# Patient Record
Sex: FEMALE | Race: WHITE | Employment: OTHER | URBAN - METROPOLITAN AREA
[De-identification: names, ages, dates, MRNs, and addresses within clinical notes are randomized per-mention and may not be internally consistent; named-entity substitution may affect disease eponyms.]

---

## 2021-08-12 ENCOUNTER — HOSPITAL ENCOUNTER (OUTPATIENT)
Dept: WOUND CARE | Age: 81
Discharge: HOME | End: 2021-08-12
Attending: PODIATRIST
Payer: MEDICARE

## 2021-08-12 VITALS
DIASTOLIC BLOOD PRESSURE: 54 MMHG | TEMPERATURE: 98.4 F | HEART RATE: 74 BPM | OXYGEN SATURATION: 100 % | SYSTOLIC BLOOD PRESSURE: 125 MMHG | RESPIRATION RATE: 18 BRPM

## 2021-08-12 PROCEDURE — 99211 OFF/OP EST MAY X REQ PHY/QHP: CPT

## 2021-08-16 NOTE — DISCHARGE INSTRUCTIONS
Discharge Instructions for  [address]  [address], [place], [facility]0 [facility]  [phone] Fax [phone]    NAME:  Judson Matamoros  YOB: 1940  MEDICAL RECORD NUMBER:  [id number]  DATE:  8/12/2021    Wound Cleansing:   Do not scrub or use excessive force. Cleanse wound prior to applying a clean dressing with:  [] Normal Saline [] Keep Wound Dry in Shower    [] Wound Cleanser   [] Cleanse wound with Mild Soap & Water  [] May Shower at Discharge   [] Other:      Topical Treatments:  Do not apply lotions, creams, or ointments to wound bed unless directed. [x] Apply moisturizing lotion to skin surrounding the wound prior to dressing change. [x] Apply antifungal ointment to skin surrounding the wound prior to dressing change. [x] Apply thin film of moisture barrier ointment to skin immediately around wound. [] Other:       Dressings:           Wound Location ***   [] Apply Primary Dressing:       [] MediHoney Gel [x] Alginate with Silver [] Alginate   [] Collagen [] Collagen with Silver   [] Santyl with Moisten saline gauze     [] Hydrocolloid   [] MediHoney Alginate [] Foam with Silver   [] Foam   [] Hydrofera Blue    [] Mepilex Border    [] Moisten with Saline [] Hydrogel [] Mepitel     [] Bactroban/Mupirocin [] Polysporin  [x] Other:  Tubular dressing[] Pack wound loosely with  [] Iodoform   [] Plain Packing  [] Other   [x] Cover and Secure with:     [x] Gauze [] Connie [] Kerlix   [] Ace Wrap [] Cover Roll Tape [] ABD     [] Other:    Avoid contact of tape with skin.   [x] Change dressing: [] Daily    [] Every Other Day [] Three times per week   [x] Once a week [] Do Not Change Dressing   [] Other:     Negative Pressure:           Wound Location:   [] Pressure@           mm/Hg  []Continuous []Intermittent   [] Black  [] White Foam [] Other:   []Change dressing: []Three times per week    []Other:     Pressure Relief:  [] When sitting, shift position or do seat lifts every 15 minutes.  [] Wheelchair cushion [] Specialty Bed/Mattress  [] Turn every 2 hours when in bed. Avoid position directing pressure on wound site. Limit side lying to 30 degree tilt. Limit HOB elevation to 30 degrees. Edema Control:  Apply: [] Compression Stocking []Right Leg []Left Leg   [] Tubigrip []Right Leg Double Layer []Left Leg Double Layer       []Right Leg Single Layer []Left Leg Single Layer   [] SpandaGrip []Right Leg  []Left Leg      []Low compression 5-10 mm/Hg      []Medium compression 10-20 mm/Hg     []High compression  20-30 mm/Hg   every morning immediately when getting up should be applied to affected leg(s) from mid foot to knee making sure to cover the heel. Remove every night before going to bed. [] Elevate leg(s) above the level of the heart when sitting. [] Avoid prolonged standing in one place. [] Elevate arm/hand above the level of the heart []RightArm []LeftArm     Compression:  Apply: [] Multilayer Compression Wrap Applied in Clinic []RightLeg []Left Leg   [] Multi-layer compression. Do not get leg(s) with wrap wet. If wraps become too tight call the center or completely remove the wrap. [] Elevate leg(s) above the level of the heart when sitting. [] Avoid prolonged standing in one place. Off-Loading:   [] Off-loading when [] walking  [] in bed [] sitting  [] Total non-weight bearing  [] Right Leg  [] Left Leg   [] Assistive Device [] Walker [] Cane  [] Wheelchair  [] Crutches   [] Surgical shoe    [] Podus Boot(s)   [] Foam Boot(s)  [] Roll About    [] Cast Boot [] CROW Boot  [] Other:    Contact Cast:  Apply: [] Total Contact Cast Applied in Clinic []RightLeg []Left Leg   [] Do not get cast wet. Contact center or go to emergency room if there is a foul odor or becomes uncomfortable due to feeling tight or swelling. Do not use objects inside of cast to scratch.       Dietary:  [x] Diet as tolerated: [] Calorie Diabetic Diet: [] No Added Salt:  [] Increase Protein: [] Other:   Activity:  [x] Activity as tolerated:  [] Patient has no activity restrictions     [] Strict Bedrest: [] Remain off Work:     [] May return to full duty work:                                   [] Return to work with restrictions:             Return Appointment:  [x] Wound and dressing supply provider:   [] ECF or Home Healthcare:  [x] Wound Assessment:* [x] Physician or NP scheduled for Wound Assessment:   [x] Return Appointment:1 week(s)  [] Ordered tests:      Electronically signed Inessa Vargas LPN on 4/32/8907 at 8:54 AM     Estefany Brady 281: Should you experience any significant changes in your wound(s) or have questions about your wound care, please contact the [address] [place] [address] at [address] [place] [address] 8:00 am - 4:30. If you need help with your wound outside these hours and cannot wait until we are again available, contact your PCP or go to the hospital emergency room. PLEASE NOTE: IF YOU ARE UNABLE TO OBTAIN WOUND SUPPLIES, CONTINUE TO USE THE SUPPLIES YOU HAVE AVAILABLE UNTIL YOU ARE ABLE TO REACH US. IT IS MOST IMPORTANT TO KEEP THE WOUND COVERED AT ALL TIMES.      Physician Signature:_______________________    Date: ___________ Time:  ____________

## 2021-08-16 NOTE — WOUND CARE
08/12/21 1618   Wound Chin Left wet 08/12/21   Date First Assessed/Time First Assessed: 08/12/21 0834   Present on Hospital Admission: Yes  Wound Approximate Age at First Assessment (Weeks): 52 weeks  Primary Wound Type: Other (comment)  Location: Chin  Wound Location Orientation: Left  Wound Desc. .. Wound Image    Wound Etiology Other (Comment)   Dressing Status Intact; New dressing applied; Old drainage noted   Cleansed Wound cleanser   Dressing/Treatment Alginate with Ag; Other (Comment)  (border,Tubular dressing.)   Dressing Change Due 08/19/21   Wound Length (cm) 3 cm   Wound Width (cm) 2.5 cm   Wound Depth (cm) 0.1 cm   Wound Surface Area (cm^2) 7.5 cm^2   Wound Volume (cm^3) 0.75 cm^3   Post-Procedure Length (cm) 3.1 cm   Post-Procedure Width (cm) 2.6 cm   Post-Procedure Depth (cm) 0.1 cm   Post-Procedure Surface Area (cm^2) 8.06 cm^2   Post-Procedure Volume (cm^3) 0.806 cm^3   Wound Assessment Pink/red   Drainage Amount Small   Drainage Description Thin;Yellow   Wound Odor None   Wound Chin Left; Inferior 08/12/21   Date First Assessed/Time First Assessed: 08/12/21 0844   Present on Hospital Admission: Yes  Wound Approximate Age at First Assessment (Weeks): 52 weeks  Primary Wound Type: Other (comment)  Location: Chin  Wound Location Orientation: Left; Inferior  D... Wound Etiology Other (Comment)   Dressing Status Intact   Cleansed Wound cleanser   Dressing/Treatment Alginate with Ag; Other (Comment)  (border tubular dressing)   Dressing Change Due 08/19/21   Wound Length (cm) 1.5 cm   Wound Width (cm) 1 cm   Wound Depth (cm) 0.1 cm   Wound Surface Area (cm^2) 1.5 cm^2   Wound Volume (cm^3) 0.15 cm^3   Post-Procedure Length (cm) 1.6 cm   Post-Procedure Width (cm) 1 cm   Post-Procedure Depth (cm) 0.1 cm   Post-Procedure Surface Area (cm^2) 1.6 cm^2   Post-Procedure Volume (cm^3) 0.16 cm^3   Wound Assessment Pink/red   Drainage Amount Small   Drainage Description Thin;Yellow   Wound Odor None Ling-Wound/Incision Assessment Blanchable erythema   Edges Attached edges   Wound Thickness Description Full thickness

## 2021-08-27 ENCOUNTER — HOSPITAL ENCOUNTER (OUTPATIENT)
Dept: WOUND CARE | Age: 81
Discharge: HOME | End: 2021-08-27
Attending: PODIATRIST
Payer: MEDICARE

## 2021-08-27 VITALS
SYSTOLIC BLOOD PRESSURE: 152 MMHG | RESPIRATION RATE: 18 BRPM | DIASTOLIC BLOOD PRESSURE: 72 MMHG | OXYGEN SATURATION: 94 % | TEMPERATURE: 98.3 F | HEART RATE: 97 BPM

## 2021-08-27 PROCEDURE — 11105 PUNCH BX SKIN EA SEP/ADDL: CPT | Performed by: PODIATRIST

## 2021-08-27 PROCEDURE — 87077 CULTURE AEROBIC IDENTIFY: CPT

## 2021-08-27 PROCEDURE — 11104 PUNCH BX SKIN SINGLE LESION: CPT

## 2021-08-27 PROCEDURE — 87205 SMEAR GRAM STAIN: CPT

## 2021-08-27 PROCEDURE — 88305 TISSUE EXAM BY PATHOLOGIST: CPT

## 2021-08-27 PROCEDURE — 87075 CULTR BACTERIA EXCEPT BLOOD: CPT

## 2021-08-27 PROCEDURE — 87186 SC STD MICRODIL/AGAR DIL: CPT

## 2021-08-27 RX ORDER — DOXYCYCLINE 100 MG/1
100 CAPSULE ORAL 2 TIMES DAILY
COMMUNITY

## 2021-08-27 NOTE — WOUND CARE
[address]:     [facility] [address]  p: [phone] f: [phone]  Ref# [id number]  Fax: [phone]    Marlen:     [facility]  [address]  [address] [address] [phone]  WOUND CARE [unfilled]   FAX NUMBER [unfilled]    Patient Information:      Maria Del Rosario [address] [address]   [unfilled]   : 1940  AGE: 80 y.o. GENDER: female   TODAYS DATE:  2021    Insurance:      PRIMARY INSURANCE:  [id number] - (Medicare)    Payor/Plan Subscr  Sex Relation Sub. Ins. ID Effective Group Num   1. [place] [address] 1940 Female Self [id number]                                    [address]   2. BLUE [facility] [place] 1937 Male Spouse [id number]                                   [address] [address]       Patient Wound Information:      Problem List Items Addressed This Visit     None          WOUNDS REQUIRING DRESSING SUPPLIES:     Wound Chin Left wet 21 (Active)   Wound Image   21 1618   Wound Etiology Other (Comment) 21 1618   Dressing Status Intact; New dressing applied; Old drainage noted 21 1618   Cleansed Wound cleanser 21 1618   Dressing/Treatment Alginate with Ag; Other (Comment) 21 1618   Dressing Change Due 21 1618   Wound Length (cm) 3 cm 21 1618   Wound Width (cm) 2.5 cm 21 1618   Wound Depth (cm) 0.1 cm 21 1618   Wound Surface Area (cm^2) 7.5 cm^2 21 1618   Wound Volume (cm^3) 0.75 cm^3 21 1618   Post-Procedure Length (cm) 3.1 cm 21 1618   Post-Procedure Width (cm) 2.6 cm 21 1618   Post-Procedure Depth (cm) 0.1 cm 21 1618   Post-Procedure Surface Area (cm^2) 8.06 cm^2 21 1618   Post-Procedure Volume (cm^3) 0.806 cm^3 21 1618   Wound Assessment Pink/red 218   Drainage Amount Small 218 Drainage Description Thin;Yellow 08/12/21 1618   Wound Odor None 08/12/21 1618   Number of days: 15       Wound Chin Left; Inferior 08/12/21 (Active)   Wound Etiology Other (Comment) 08/12/21 1618   Dressing Status Intact 08/12/21 1618   Cleansed Wound cleanser 08/12/21 1618   Dressing/Treatment Alginate with Ag; Other (Comment) 08/12/21 1618   Dressing Change Due 08/19/21 08/12/21 1618   Wound Length (cm) 1.5 cm 08/12/21 1618   Wound Width (cm) 1 cm 08/12/21 1618   Wound Depth (cm) 0.1 cm 08/12/21 1618   Wound Surface Area (cm^2) 1.5 cm^2 08/12/21 1618   Wound Volume (cm^3) 0.15 cm^3 08/12/21 1618   Post-Procedure Length (cm) 1.6 cm 08/12/21 1618   Post-Procedure Width (cm) 1 cm 08/12/21 1618   Post-Procedure Depth (cm) 0.1 cm 08/12/21 1618   Post-Procedure Surface Area (cm^2) 1.6 cm^2 08/12/21 1618   Post-Procedure Volume (cm^3) 0.16 cm^3 08/12/21 1618   Wound Assessment Pink/red 08/12/21 1618   Drainage Amount Small 08/12/21 1618   Drainage Description Thin;Yellow 08/12/21 1618   Wound Odor None 08/12/21 1618   Ling-Wound/Incision Assessment Blanchable erythema 08/12/21 1618   Edges Attached edges 08/12/21 1618   Wound Thickness Description Full thickness 08/12/21 1618   Number of days: 15       Wound Pretibial Left Bullous type lesion 06/01/21 (Active)   Wound Image   08/27/21 1334   Wound Etiology Other (Comment) 08/27/21 1334   Dressing Status Breakthrough drainage noted 08/27/21 1334   Cleansed Cleansed with saline 08/27/21 1334   Dressing/Treatment Alginate with Ag 08/27/21 1334   Dressing Change Due 08/28/21 08/27/21 1334   Wound Length (cm) 5 cm 08/27/21 1334   Wound Width (cm) 3.5 cm 08/27/21 1334   Wound Depth (cm) 0.1 cm 08/27/21 1334   Wound Surface Area (cm^2) 17.5 cm^2 08/27/21 1334   Wound Volume (cm^3) 1.75 cm^3 08/27/21 1334   Wound Assessment Other (Comment) 08/27/21 1334   Drainage Amount Large 08/27/21 1334   Drainage Description Serous 08/27/21 1334   Wound Odor None 08/27/21 1334 Ling-Wound/Incision Assessment Blanchable erythema 08/27/21 1334   Edges Other (Comment) 08/27/21 1334   Wound Thickness Description Partial thickness 08/27/21 1334   Number of days: 15        Supplies Requested :      WOUND #:1   PRIMARY DRESSING:  Alginate with silver pad   4X4 gauze pad     FREQUENCY OF DRESSING CHANGES:  Daily       ADDITIONAL ITEMS:  [] Gloves Small  [x] Gloves Medium [] Gloves Large [] Gloves XLarge  [] Tape 1\" [x] Tape 2\" Hypafix [] Tape 3\"  [] Medipore Tape  [x] Saline  [] Skin Prep   [x] Adhesive Remover   [] Cotton Tip Applicators   [] Other:    Patient Wound(s) Debrided: [x] Yes if yes please add date 8/27/2021   [] No    Debribement Type: Excisional/Sharp    Patient currently being seen by Home Health: [] Yes   [x] No    Duration for needed supplies:  []15  [x]30  []60  []90 Days    Electronically signed by Yissel Seals RN on 8/27/2021 at 3:32 PM    Provider Information:      Josetta Gilford NAME: Kristian Manzo     NPI: [id number]

## 2021-08-27 NOTE — WOUND CARE
08/27/21 1334   Wound Pretibial Left Bullous type lesion 06/01/21   Date First Assessed/Time First Assessed: 08/12/21 0917   Present on Hospital Admission: Yes  Wound Approximate Age at First Assessment (Weeks): 20 weeks  Primary Wound Type: Other (comment)  Location: Pretibial  Wound Location Orientation: Left  Wound. ..    Wound Image    Wound Etiology Other (Comment)   Dressing Status Breakthrough drainage noted   Cleansed Cleansed with saline   Dressing/Treatment Alginate with Ag   Dressing Change Due 08/28/21   Wound Length (cm) 5 cm   Wound Width (cm) 3.5 cm   Wound Depth (cm) 0.1 cm   Wound Surface Area (cm^2) 17.5 cm^2   Wound Volume (cm^3) 1.75 cm^3   Wound Assessment Other (Comment)   Drainage Amount Large   Drainage Description Serous   Wound Odor None   Ling-Wound/Incision Assessment Blanchable erythema   Edges Other (Comment)   Wound Thickness Description Partial thickness

## 2021-08-27 NOTE — WOUND CARE
Procedure  Biopsy Note  Patient is an 60-year-old female who returns to the wound care clinic for continued care of 2 large soft tissue mass/tumors on the front of her left leg. She states that she is not sure how they got there, or what they are and would like to know. Therefore biopsy and wound culture are planned for today. Also patient had an x-ray of her left leg that shows a lucency in the anterior tibial cortex overlying the soft tissue abnormality. Patient has 2 total knee implants and is not a good candidate for MRI, therefore a prescription was given for bone scan of the left tibia to rule out any pathology or osteomyelitis deep to the soft tissue mass/tumors. Biopsy Type: Punch Biopsy of skin single lesion and Punch Biopsy of skin each separate/additional lesion    Performed by: Mohan Torres DPM    Consent obtained? Yes     Time out taken: Yes    Location of Biopsy:  Wound/Ulcer Number(s)  Wound/Ulcer #: 2    Wound Chin Left wet 08/12/21 (Active)   Wound Image   08/12/21 1618   Wound Etiology Other (Comment) 08/12/21 1618   Dressing Status Intact; New dressing applied; Old drainage noted 08/12/21 1618   Cleansed Wound cleanser 08/12/21 1618   Dressing/Treatment Alginate with Ag; Other (Comment) 08/12/21 1618   Dressing Change Due 08/19/21 08/12/21 1618   Wound Length (cm) 3 cm 08/12/21 1618   Wound Width (cm) 2.5 cm 08/12/21 1618   Wound Depth (cm) 0.1 cm 08/12/21 1618   Wound Surface Area (cm^2) 7.5 cm^2 08/12/21 1618   Wound Volume (cm^3) 0.75 cm^3 08/12/21 1618   Post-Procedure Length (cm) 3.1 cm 08/12/21 1618   Post-Procedure Width (cm) 2.6 cm 08/12/21 1618   Post-Procedure Depth (cm) 0.1 cm 08/12/21 1618   Post-Procedure Surface Area (cm^2) 8.06 cm^2 08/12/21 1618   Post-Procedure Volume (cm^3) 0.806 cm^3 08/12/21 1618   Wound Assessment Pink/red 08/12/21 1618   Drainage Amount Small 08/12/21 1618   Drainage Description Thin;Yellow 08/12/21 1618   Wound Odor None 08/12/21 1618   Number of days: 15       Wound Chin Left; Inferior 08/12/21 (Active)   Wound Etiology Other (Comment) 08/12/21 1618   Dressing Status Intact 08/12/21 1618   Cleansed Wound cleanser 08/12/21 1618   Dressing/Treatment Alginate with Ag; Other (Comment) 08/12/21 1618   Dressing Change Due 08/19/21 08/12/21 1618   Wound Length (cm) 1.5 cm 08/12/21 1618   Wound Width (cm) 1 cm 08/12/21 1618   Wound Depth (cm) 0.1 cm 08/12/21 1618   Wound Surface Area (cm^2) 1.5 cm^2 08/12/21 1618   Wound Volume (cm^3) 0.15 cm^3 08/12/21 1618   Post-Procedure Length (cm) 1.6 cm 08/12/21 1618   Post-Procedure Width (cm) 1 cm 08/12/21 1618   Post-Procedure Depth (cm) 0.1 cm 08/12/21 1618   Post-Procedure Surface Area (cm^2) 1.6 cm^2 08/12/21 1618   Post-Procedure Volume (cm^3) 0.16 cm^3 08/12/21 1618   Wound Assessment Pink/red 08/12/21 1618   Drainage Amount Small 08/12/21 1618   Drainage Description Thin;Yellow 08/12/21 1618   Wound Odor None 08/12/21 1618   Ling-Wound/Incision Assessment Blanchable erythema 08/12/21 1618   Edges Attached edges 08/12/21 1618   Wound Thickness Description Full thickness 08/12/21 1618   Number of days: 15       Wound Pretibial Left Bullous type lesion 06/01/21 (Active)   Wound Image   08/27/21 1334   Wound Etiology Other (Comment) 08/27/21 1334   Dressing Status Breakthrough drainage noted 08/27/21 1334   Cleansed Cleansed with saline 08/27/21 1334   Dressing/Treatment Alginate with Ag 08/27/21 1334   Dressing Change Due 08/28/21 08/27/21 1334   Wound Length (cm) 5 cm 08/27/21 1334   Wound Width (cm) 3.5 cm 08/27/21 1334   Wound Depth (cm) 0.1 cm 08/27/21 1334   Wound Surface Area (cm^2) 17.5 cm^2 08/27/21 1334   Wound Volume (cm^3) 1.75 cm^3 08/27/21 1334   Wound Assessment Other (Comment) 08/27/21 1334   Drainage Amount Large 08/27/21 1334   Drainage Description Serous 08/27/21 1334   Wound Odor None 08/27/21 1334   Ling-Wound/Incision Assessment Blanchable erythema 08/27/21 1334   Edges Other (Comment) 08/27/21 1334 Wound Thickness Description Partial thickness 08/27/21 1334   Number of days: 15        Biopsy performed with: 3 mm skin punch     Instruments: # 11 blade scalpel      Specimen sent to Pathology:Yes    Total number of Biopsy Specimen: 3     Estimated Blood Loss:  Minimal     Hemostasis Achieved: Pressure    Procedural Pain: 1 / 10     Post Procedural Pain: 2 / 10     Response to treatment: Well tolerated by patient    3 punch biopsy specimens were obtained today from the left leg and also deep wound cultures were taken from the lesions and sent to micro. Patient will follow up in the wound care clinic in 1 week.

## 2021-08-27 NOTE — DISCHARGE INSTRUCTIONS
Discharge Instructions for  [address]  [address] [place], [place], [facility]0 [facility]  [phone] Fax [phone]    NAME:  Mitch Farrell  YOB: 1940  MEDICAL RECORD NUMBER:  [id number]  DATE:  8/27/2021    Wound Cleansing:   Do not scrub or use excessive force. Cleanse wound prior to applying a clean dressing with:  [x] Normal Saline [] Keep Wound Dry in Shower    [] Wound Cleanser   [] Cleanse wound with Mild Soap & Water  [] May Shower at Discharge   [] Other:      Topical Treatments:  Do not apply lotions, creams, or ointments to wound bed unless directed. Dressings:           Wound Location L leg   [] Apply Primary Dressing:       [] MediHoney Gel [x] Alginate with Silver [] Alginate   [] Collagen [] Collagen with Silver   [] Santyl with Moisten saline gauze     [] Hydrocolloid   [] MediHoney Alginate [] Foam with Silver   [] Foam   [] Hydrofera Blue    [] Mepilex Border    [] Moisten with Saline [] Hydrogel [] Mepitel     [] Bactroban/Mupirocin [] Polysporin  [] Other:    [] Pack wound loosely with  [] Iodoform   [] Plain Packing  [] Other   [x] Cover and Secure with:     [x] Gauze [] Connie [] Kerlix   [] Ace Wrap [] Cover Roll Tape [] ABD     [] Other:    Avoid contact of tape with skin.   [x] Change dressing: [x] Daily    [] Every Other Day [] Three times per week   [] Once a week [] Do Not Change Dressing   [] Other:     Dietary:  [x] Diet as tolerated: [] Calorie Diabetic Diet: [] No Added Salt:  [] Increase Protein: [] Other:     Activity:  [x] Activity as tolerated:  [] Patient has no activity restrictions     [] Strict Bedrest: [] Remain off Work:     [] May return to full duty work:                                   [] Return to work with restrictions:             Return Appointment:  [] Wound and dressing supply provider:   [] ECF or Home Healthcare:  [] Wound Assessment: [] Physician or NP scheduled for Wound Assessment:   [x] Return Appointment: With MD once bone scan completed   [] Ordered tests:      Electronically signed April David Krause RN on 8/27/2021 at 1:48 PM     [address] Information: Should you experience any significant changes in your wound(s) or have questions about your wound care, please contact the [facility] at [address] [place] [address] 8:00 am - 4:30. If you need help with your wound outside these hours and cannot wait until we are again available, contact your PCP or go to the hospital emergency room. PLEASE NOTE: IF YOU ARE UNABLE TO OBTAIN WOUND SUPPLIES, CONTINUE TO USE THE SUPPLIES YOU HAVE AVAILABLE UNTIL YOU ARE ABLE TO REACH US. IT IS MOST IMPORTANT TO KEEP THE WOUND COVERED AT ALL TIMES.

## 2021-08-29 LAB
BACTERIA SPEC CULT: NORMAL
SERVICE CMNT-IMP: NORMAL

## 2021-08-30 LAB
BACTERIA SPEC CULT: ABNORMAL
GRAM STN SPEC: ABNORMAL
GRAM STN SPEC: ABNORMAL
SERVICE CMNT-IMP: ABNORMAL

## 2021-11-03 NOTE — WOUND CARE
[address]   Progress Note and Procedure Note   NO Procedure      Mundo Mary  MEDICAL RECORD NUMBER:  [id number]  AGE: 80 y.o. RACE WHITE/NON-  GENDER: female  : 1940  EPISODE DATE:  2021    Subjective:     Chief Complaint   Patient presents with    Wound Check         HISTORY of PRESENT ILLNESS HPI    [email or web address] is a 80 y.o. female who presents today for wound/ulcer evaluation. History of Wound Context: Patient is complaining of a open wound on her left shin. She states is started in 2020 with a small red spot, then in 2021 it started getting larger with a bump, she went to her Primary Care Physician, Dr. Marcy Cruz, prescribed her antibiotic in , for three weeks, with some improvement. Patient has been doing home treatments daily  She returned to Dr. Maria Ines Rosario, in 2021, a wound culture was taken, patient states her pain is (1/10) and has continued with her Bacitracin ointment daily. On 2021, Dr. Maria Ines Rosario took a biopsy. On August 3, 2021, the biopsy results - No Cancer, it showed - Culture Strep with Gram Negative Rods. Wound/Ulcer Pain Timing/Severity: severe  Quality of pain: aching and radiating  Severity:  3 / 10   Modifying Factors: Pain worsens with walking  Associated Signs/Symptoms: none    Ulcer Identification:  Ulcer Type: Soft tissue mass    Contributing Factors: none    Wound:   Soft tissue mass on the anterior left shin - X2 measuring (3.0cm x 2.5cm x 0.1cm) superior soft tissue mass, and  (1.5cm x 1.0cm x 0/1cm) inferior soft tissue mass.          PAST MEDICAL HISTORY    Past Medical History:   Diagnosis Date    Arthritis     Hypertension 1998    Other ill-defined conditions(799.89)     infection left knee        PAST SURGICAL HISTORY    Past Surgical History:   Procedure Laterality Date    HX DILATION AND CURETTAGE      HX ORTHOPAEDIC      bilateral knees replaced    HX ORTHOPAEDIC  1-    removal of left knee joint    HX ORTHOPAEDIC  2014    Left knee replacement    HX TONSILLECTOMY         FAMILY HISTORY    No family history on file. SOCIAL HISTORY    Social History     Tobacco Use    Smoking status: Never Smoker    Smokeless tobacco: Never Used   Substance Use Topics    Alcohol use: Yes     Comment: 2-4 x year    Drug use: No       ALLERGIES    No Known Allergies    MEDICATIONS    Current Outpatient Medications on File Prior to Encounter   Medication Sig Dispense Refill    aspirin delayed-release 81 mg tablet Take 81 mg by mouth daily.  traMADol (ULTRAM) 50 mg tablet Take 50 mg by mouth every six (6) hours as needed for Pain.  cholecalciferol, VITAMIN D3, (VITAMIN D3) 5,000 unit tab tablet Take 5,000 Units by mouth daily.  flaxseed oil 1,000 mg cap Take  by mouth daily.  CINNAMON BARK PO Take  by mouth two (2) times a day.  acetaminophen (TYLENOL EXTRA STRENGTH) 500 mg tablet Take 1,000 mg by mouth every six (6) hours as needed for Pain.  meloxicam (MOBIC) 15 mg tablet Take 15 mg by mouth daily.  NIFEdipine CR (ADALAT CC) 60 mg CR tablet Take 60 mg by mouth daily. Indications: HYPERTENSION      losartan-hydrochlorothiazide (HYZAAR) 50-12.5 mg per tablet Take 1 Tab by mouth daily. Indications: HYPERTENSION      Calcium-Cholecalciferol, D3, (CALCIUM 600 WITH VITAMIN D3) 600 mg(1,500mg) -400 unit cap Take  by mouth daily.  fish oil-dha-epa 1,200-144-216 mg cap Take 1 Cap by mouth two (2) times a day.  multivitamins-minerals-lutein (CENTRUM SILVER) Tab Take  by mouth daily. No current facility-administered medications on file prior to encounter. REVIEW OF SYSTEMS    A comprehensive review of systems was negative except for that written in the HPI.     Objective:     Visit Vitals  BP (!) 125/54 (BP Patient Position: At rest;Sitting)   Pulse 74   Temp 98.4 °F (36.9 °C)   Resp 18   SpO2 100%       Wt Readings from Last 3 Encounters:   01/13/16 121.6 kg (268 lb)   01/12/16 121.6 kg (268 lb)   11/20/13 115.9 kg (255 lb 9.6 oz)       PHYSICAL EXAM    Pertinent items are noted in HPI. Vascular:  Dorsalis pedis pulses are 1/4 bilateral.  Posterior tibial pulses are 1/4 bilateral.  Capillary fill time is less than 3 seconds, bilateral.  Negative Edema is observed bilateral lower extremities. Varicosities are observed bilateral lower extremities. Derm:  Skin temperature of lower extremities warm to cool proximal to distal bilateral.  Inspection of skin shows - chronic soft tissue mass on the anterior left leg - X2 Superior soft tissue mass measuring (3.0cm x 2.5cm x 0.1cm), Interior soft tissue mass measuring (1.5cm x 1.0cm x 0.1cm). Ortho:  Muscle strength 5/5 for all groups tested. Muscle tone normal. Inspection/palpation of bones - joints- muscle shows - within normal limits bilateral lower extremities. Neuro:  Light touch, sharp/dull, vibratory, and proprioception sensations all intact bilateral lower extremities. Deep tendon reflexes intact bilaterally. Assessment:      1. Chronic Soft Tissue Mass on the Left leg to -rule out- Sequella from the Left Knee Implant Infection. Problem List Items Addressed This Visit     None          Procedure Note  Indications:  Based on my examination of this patient's wound(s)/ulcer(s) today, debridement is not required to promote healing and evaluate the wound base. Wound Chin Left wet 08/12/21 (Active)   Wound Image   08/12/21 1618   Wound Etiology Other (Comment) 08/12/21 1618   Dressing Status Intact; New dressing applied; Old drainage noted 08/12/21 1618   Cleansed Wound cleanser 08/12/21 1618   Dressing/Treatment Alginate with Ag;  Other (Comment) 08/12/21 1618   Dressing Change Due 08/19/21 08/12/21 1618   Wound Length (cm) 3 cm 08/12/21 1618   Wound Width (cm) 2.5 cm 08/12/21 1618   Wound Depth (cm) 0.1 cm 08/12/21 1618   Wound Surface Area (cm^2) 7.5 cm^2 08/12/21 1618   Wound Volume (cm^3) 0.75 cm^3 08/12/21 1618   Post-Procedure Length (cm) 3.1 cm 08/12/21 1618   Post-Procedure Width (cm) 2.6 cm 08/12/21 1618   Post-Procedure Depth (cm) 0.1 cm 08/12/21 1618   Post-Procedure Surface Area (cm^2) 8.06 cm^2 08/12/21 1618   Post-Procedure Volume (cm^3) 0.806 cm^3 08/12/21 1618   Wound Assessment Pink/red 08/12/21 1618   Drainage Amount Small 08/12/21 1618   Drainage Description Thin; Yellow 08/12/21 1618   Wound Odor None 08/12/21 1618   Number of days: 83       Wound Chin Left; Inferior 08/12/21 (Active)   Wound Etiology Other (Comment) 08/12/21 1618   Dressing Status Intact 08/12/21 1618   Cleansed Wound cleanser 08/12/21 1618   Dressing/Treatment Alginate with Ag;  Other (Comment) 08/12/21 1618   Dressing Change Due 08/19/21 08/12/21 1618   Wound Length (cm) 1.5 cm 08/12/21 1618   Wound Width (cm) 1 cm 08/12/21 1618   Wound Depth (cm) 0.1 cm 08/12/21 1618   Wound Surface Area (cm^2) 1.5 cm^2 08/12/21 1618   Wound Volume (cm^3) 0.15 cm^3 08/12/21 1618   Post-Procedure Length (cm) 1.6 cm 08/12/21 1618   Post-Procedure Width (cm) 1 cm 08/12/21 1618   Post-Procedure Depth (cm) 0.1 cm 08/12/21 1618   Post-Procedure Surface Area (cm^2) 1.6 cm^2 08/12/21 1618   Post-Procedure Volume (cm^3) 0.16 cm^3 08/12/21 1618   Wound Assessment Pink/red 08/12/21 1618   Drainage Amount Small 08/12/21 1618   Drainage Description Thin; Yellow 08/12/21 1618   Wound Odor None 08/12/21 1618   Ling-Wound/Incision Assessment Blanchable erythema 08/12/21 1618   Edges Attached edges 08/12/21 1618   Wound Thickness Description Full thickness 08/12/21 1618   Number of days: 83       Wound Pretibial Left Bullous type lesion 06/01/21 (Active)   Wound Image   08/27/21 1334   Wound Etiology Other (Comment) 08/27/21 1334   Dressing Status Breakthrough drainage noted 08/27/21 1334   Cleansed Cleansed with saline 08/27/21 1334   Dressing/Treatment Alginate with Ag 08/27/21 1334   Dressing Change Due 08/28/21 08/27/21 1334   Wound Length (cm) 5 cm 08/27/21 1334   Wound Width (cm) 3.5 cm 08/27/21 1334   Wound Depth (cm) 0.1 cm 08/27/21 1334   Wound Surface Area (cm^2) 17.5 cm^2 08/27/21 1334   Wound Volume (cm^3) 1.75 cm^3 08/27/21 1334   Wound Assessment Other (Comment) 08/27/21 1334   Drainage Amount Large 08/27/21 1334   Drainage Description Serous 08/27/21 1334   Wound Odor None 08/27/21 1334   Ling-Wound/Incision Assessment Blanchable erythema 08/27/21 1334   Edges Other (Comment) 08/27/21 1334   Wound Thickness Description Partial thickness 08/27/21 1334   Number of days: 83        Plan:     1. New patient - local wound care:  Clean with a wound cleanser, apply dry sterile dressing. 2.  Dispense prescription for MRI - Soft Tissue Mass on the Left Leg to rule out - Bone/Soft Tissue Pathology. 3.  Patient to return to the [address] [place] [address] for follow up continued treatment. Treatment Note please see attached Discharge Instructions    Written patient dismissal instructions given to patient or POA.          Electronically signed by Harman Cook DPM on 11/3/2021 at 10:43 AM

## 2021-12-23 ENCOUNTER — HOSPITAL ENCOUNTER (OUTPATIENT)
Dept: WOUND CARE | Age: 81
Discharge: HOME | End: 2021-12-23
Attending: PODIATRIST
Payer: MEDICARE

## 2021-12-23 VITALS
DIASTOLIC BLOOD PRESSURE: 80 MMHG | SYSTOLIC BLOOD PRESSURE: 159 MMHG | HEART RATE: 72 BPM | OXYGEN SATURATION: 96 % | TEMPERATURE: 98.7 F | RESPIRATION RATE: 16 BRPM

## 2021-12-23 PROCEDURE — 11042 DBRDMT SUBQ TIS 1ST 20SQCM/<: CPT

## 2021-12-23 NOTE — DISCHARGE INSTRUCTIONS
Discharge Instructions for  [address]  [address], [address] [facility]  [phone] Fax [phone]    NAME:  Patrizia Born OF BIRTH:  1940  MEDICAL RECORD NUMBER:  [id number]  DATE:  12/23/2021    Wound Cleansing:   Do not scrub or use excessive force. Cleanse wound prior to applying a clean dressing with:  [] Normal Saline [] Keep Wound Dry in Shower    [x] Wound Cleanser   [] Cleanse wound with Mild Soap & Water  [] May Shower at Discharge   [] Other:      Topical Treatments:  Do not apply lotions, creams, or ointments to wound bed unless directed. [] Apply moisturizing lotion to skin surrounding the wound prior to dressing change.  [] Apply antifungal ointment to skin surrounding the wound prior to dressing change. [x] Apply thin film of moisture barrier ointment to skin immediately around wound. [] Other:       Dressings:           Wound Location ***   [x] Apply Primary Dressing:       [] MediHoney Gel [] Alginate with Silver [] Alginate   [] Collagen [x] Collagen with Silver   [] Santyl with Moisten saline gauze     [] Hydrocolloid   [] MediHoney Alginate [] Foam with Silver   [] Foam   [] Hydrofera Blue    [] Mepilex Border    [] Moisten with Saline [] Hydrogel [x] Mepitel one    [] Bactroban/Mupirocin [] Polysporin  [] Other:    [] Pack wound loosely with  [] Iodoform   [] Plain Packing  [] Other   [x] Cover and Secure with:     [] Gauze [] Connie [] Kerlix   [] Ace Wrap [] Cover Roll Tape [] ABD     [x] Other: soft roll and coban   Avoid contact of tape with skin.   [] Change dressing: [] Daily    [] Every Other Day [] Three times per week   [] Once a week [] Do Not Change Dressing   [] Other:     Negative Pressure:           Wound Location:   [x] Pressure@         125  mm/Hg  [x]Continuous []Intermittent   [x] Black  [] White Foam [] Other:   []Change dressing: []Three times per week    [x]Other:     Pressure Relief:  [x] When sitting, shift position or do seat lifts every 15 minutes. [x] Wheelchair cushion [] Specialty Bed/Mattress  [] Turn every 2 hours when in bed. Avoid position directing pressure on wound site. Limit side lying to 30 degree tilt. Limit HOB elevation to 30 degrees. Edema Control:  Apply: [] Compression Stocking []Right Leg []Left Leg   [] Tubigrip []Right Leg Double Layer []Left Leg Double Layer       []Right Leg Single Layer []Left Leg Single Layer   [] SpandaGrip []Right Leg  []Left Leg      []Low compression 5-10 mm/Hg      []Medium compression 10-20 mm/Hg     []High compression  20-30 mm/Hg   every morning immediately when getting up should be applied to affected leg(s) from mid foot to knee making sure to cover the heel. Remove every night before going to bed. [] Elevate leg(s) above the level of the heart when sitting. [] Avoid prolonged standing in one place. [] Elevate arm/hand above the level of the heart []RightArm []LeftArm     Compression:  Apply: [x] Multilayer Compression Wrap Applied in Clinic []RightLeg [x]Left Leg   [] Multi-layer compression. Do not get leg(s) with wrap wet. If wraps become too tight call the center or completely remove the wrap. [x] Elevate leg(s) above the level of the heart when sitting. [x] Avoid prolonged standing in one place. Off-Loading:   [x] Off-loading when [x] walking  [x] in bed [x] sitting  [] Total non-weight bearing  [] Right Leg  [] Left Leg   [x] Assistive Device [] Walker [] Cane  [x] Wheelchair  [] Crutches   [] Surgical shoe    [] Podus Boot(s)   [] Foam Boot(s)  [] Roll About    [] Cast Boot [] CROW Boot  [] Other:    Contact Cast:  Apply: [] Total Contact Cast Applied in Clinic []RightLeg []Left Leg   [] Do not get cast wet. Contact center or go to emergency room if there is a foul odor or becomes uncomfortable due to feeling tight or swelling. Do not use objects inside of cast to scratch.       Dietary:  [x] Diet as tolerated: [] Calorie Diabetic Diet: [] No Added Salt:  [] Increase Protein: [] Other:   Activity:  [x] Activity as tolerated:  [] Patient has no activity restrictions     [] Strict Bedrest: [] Remain off Work:     [] May return to full duty work:                                   [] Return to work with restrictions:             Return Appointment:  [] Wound and dressing supply provider:   [] ECF or Home Healthcare:  [x] Wound Assessment: [x] Physician or NP scheduled for Wound Assessment:   [x] Return Appointment: 1 Week(s)  [] Ordered tests:      Electronically signed Joy Dubon LPN on 16/94/2673 at 3:30 PM     Estefany Brady 281: Should you experience any significant changes in your wound(s) or have questions about your wound care, please contact the [facility] at [address] [place] [address] 8:00 am - 4:30. If you need help with your wound outside these hours and cannot wait until we are again available, contact your PCP or go to the hospital emergency room. PLEASE NOTE: IF YOU ARE UNABLE TO OBTAIN WOUND SUPPLIES, CONTINUE TO USE THE SUPPLIES YOU HAVE AVAILABLE UNTIL YOU ARE ABLE TO REACH US. IT IS MOST IMPORTANT TO KEEP THE WOUND COVERED AT ALL TIMES.      Physician Signature:_______________________    Date: ___________ Time:  ____________

## 2021-12-23 NOTE — WOUND CARE
12/23/21 1502   Wound Knee Left surgical 12/23/21   Date First Assessed/Time First Assessed: 12/23/21 1100   Present on Hospital Admission: Yes  Wound Approximate Age at First Assessment (Weeks): 4 weeks  Primary Wound Type: Open incision/surgical site  Location: Knee  Wound Location Orientation: Left . .. Wound Image      Wound Etiology Non-Healing Surgical   Dressing Status Intact; New drainage noted; Old drainage noted   Cleansed Wound cleanser   Dressing/Treatment Barrier film;Band-Aid/Adhesive bandage; Cast padding;Collagen with Ag;Negative Pressure Wound Therapy   Offloading for Diabetic Foot Ulcers No offloading required   Dressing Change Due 12/30/21   Wound Length (cm) 1.2 cm   Wound Width (cm) 1.3 cm   Wound Depth (cm) 0.7 cm   Wound Surface Area (cm^2) 1.56 cm^2   Wound Volume (cm^3) 1.092 cm^3   Post-Procedure Length (cm) 1.5 cm   Post-Procedure Width (cm) 1.5 cm   Post-Procedure Depth (cm) 1 cm   Post-Procedure Surface Area (cm^2) 2.25 cm^2   Post-Procedure Volume (cm^3) 2.25 cm^3   Undermining Starts ___ O'Clock 12 o'clock   Undermining Ends ___ O'Clock 12 o'clock   Undermining Maximum Distance (cm) 0.3 cm   Wound Assessment Devitalized tissue;Dusky;Fibrin;Slough   Drainage Amount Moderate   Drainage Description Serosanguinous   Wound Odor None   Ling-Wound/Incision Assessment Blanchable erythema; Intact   Edges Epibole (rolled edges)   Wound Thickness Description Full thickness   Pain 1   Pain Scale 1 Numeric (0 - 10)   Pain Intensity 1 0   Patient Stated Pain Goal 0

## 2021-12-29 NOTE — WOUND CARE
Debridement Wound Care        Problem List Items Addressed This Visit     None          Procedure Note  Indications:  Based on my examination of this patient's wound(s)/ulcer(s) today, debridement is required to promote healing and evaluate the wound base. Patient is returning to the [address] [place] [address], complaining of a new open ulcer infection on the left knee,. Dr. Warden Ellison did her surgery in October 2021 to removed the infected knee implant and put in a temp spacer. The open ulcer infection on the left knee measuring (1xcm x 1.3cm x 0.7cm) has a tan Fibrotic base. Sharp debridement required, apply wound (VAC-VIA). Performed by: Asim Montiel DPM    Consent obtained: Yes    Time out taken: Yes    Debridement: Excisional    Using curette the wound(s)/ulcer(s) was/were sharply debrided down through and including the removal of    subcutaneous tissue    Devitalized Tissue Debrided: tan fibrotic base    Pre Debridement Measurements:  Are located in the [place]  Documentation Flow Sheet    Wound/Ulcer #: 1    Post Debridement Measurements:  Wound/Ulcer Descriptions are Pre Debridement except measurements:Non-Pressure ulcer, fat layer exposed    Wound Chin Left wet 08/12/21 (Active)   Wound Image   08/12/21 1618   Wound Etiology Other (Comment) 08/12/21 1618   Dressing Status Intact; New dressing applied; Old drainage noted 08/12/21 1618   Cleansed Wound cleanser 08/12/21 1618   Dressing/Treatment Alginate with Ag; Other (Comment) 08/12/21 1618   Dressing Change Due 08/19/21 08/12/21 1618   Wound Length (cm) 3 cm 08/12/21 1618   Wound Width (cm) 2.5 cm 08/12/21 1618   Wound Depth (cm) 0.1 cm 08/12/21 1618   Wound Surface Area (cm^2) 7.5 cm^2 08/12/21 1618   Wound Volume (cm^3) 0.75 cm^3 08/12/21 1618   Post-Procedure Length (cm) 3.1 cm 08/12/21 1618   Post-Procedure Width (cm) 2.6 cm 08/12/21 1618   Post-Procedure Depth (cm) 0.1 cm 08/12/21 1618   Post-Procedure Surface Area (cm^2) 8.06 cm^2 08/12/21 1618 Post-Procedure Volume (cm^3) 0.806 cm^3 08/12/21 1618   Wound Assessment Pink/red 08/12/21 1618   Drainage Amount Small 08/12/21 1618   Drainage Description Thin;Yellow 08/12/21 1618   Wound Odor None 08/12/21 1618   Number of days: 139       Wound Chin Left; Inferior 08/12/21 (Active)   Wound Etiology Other (Comment) 08/12/21 1618   Dressing Status Intact 08/12/21 1618   Cleansed Wound cleanser 08/12/21 1618   Dressing/Treatment Alginate with Ag; Other (Comment) 08/12/21 1618   Dressing Change Due 08/19/21 08/12/21 1618   Wound Length (cm) 1.5 cm 08/12/21 1618   Wound Width (cm) 1 cm 08/12/21 1618   Wound Depth (cm) 0.1 cm 08/12/21 1618   Wound Surface Area (cm^2) 1.5 cm^2 08/12/21 1618   Wound Volume (cm^3) 0.15 cm^3 08/12/21 1618   Post-Procedure Length (cm) 1.6 cm 08/12/21 1618   Post-Procedure Width (cm) 1 cm 08/12/21 1618   Post-Procedure Depth (cm) 0.1 cm 08/12/21 1618   Post-Procedure Surface Area (cm^2) 1.6 cm^2 08/12/21 1618   Post-Procedure Volume (cm^3) 0.16 cm^3 08/12/21 1618   Wound Assessment Pink/red 08/12/21 1618   Drainage Amount Small 08/12/21 1618   Drainage Description Thin;Yellow 08/12/21 1618   Wound Odor None 08/12/21 1618   Ling-Wound/Incision Assessment Blanchable erythema 08/12/21 1618   Edges Attached edges 08/12/21 1618   Wound Thickness Description Full thickness 08/12/21 1618   Number of days: 139       Wound Pretibial Left Bullous type lesion 06/01/21 (Active)   Wound Image   08/27/21 1334   Wound Etiology Other (Comment) 08/27/21 1334   Dressing Status Breakthrough drainage noted 08/27/21 1334   Cleansed Cleansed with saline 08/27/21 1334   Dressing/Treatment Alginate with Ag 08/27/21 1334   Dressing Change Due 08/28/21 08/27/21 1334   Wound Length (cm) 5 cm 08/27/21 1334   Wound Width (cm) 3.5 cm 08/27/21 1334   Wound Depth (cm) 0.1 cm 08/27/21 1334   Wound Surface Area (cm^2) 17.5 cm^2 08/27/21 1334   Wound Volume (cm^3) 1.75 cm^3 08/27/21 1334   Wound Assessment Other (Comment) 08/27/21 1334   Drainage Amount Large 08/27/21 1334   Drainage Description Serous 08/27/21 1334   Wound Odor None 08/27/21 1334   Ling-Wound/Incision Assessment Blanchable erythema 08/27/21 1334   Edges Other (Comment) 08/27/21 1334   Wound Thickness Description Partial thickness 08/27/21 1334   Number of days: 139       Wound Knee Left surgical 12/23/21 (Active)   Wound Image     12/23/21 1502   Wound Etiology Non-Healing Surgical 12/23/21 1502   Dressing Status Intact; New drainage noted; Old drainage noted 12/23/21 1502   Cleansed Wound cleanser 12/23/21 1502   Dressing/Treatment Barrier film;Band-Aid/Adhesive bandage; Cast padding;Collagen with Ag;Negative Pressure Wound Therapy 12/23/21 1502   Offloading for Diabetic Foot Ulcers No offloading required 12/23/21 1502   Dressing Change Due 12/30/21 12/23/21 1502   Wound Length (cm) 1.2 cm 12/23/21 1502   Wound Width (cm) 1.3 cm 12/23/21 1502   Wound Depth (cm) 0.7 cm 12/23/21 1502   Wound Surface Area (cm^2) 1.56 cm^2 12/23/21 1502   Wound Volume (cm^3) 1.092 cm^3 12/23/21 1502   Post-Procedure Length (cm) 1.5 cm 12/23/21 1502   Post-Procedure Width (cm) 1.5 cm 12/23/21 1502   Post-Procedure Depth (cm) 1 cm 12/23/21 1502   Post-Procedure Surface Area (cm^2) 2.25 cm^2 12/23/21 1502   Post-Procedure Volume (cm^3) 2.25 cm^3 12/23/21 1502   Undermining Starts ___ O'Clock 12 o'clock 12/23/21 1502   Undermining Ends ___ O'Clock 12 o'clock 12/23/21 1502   Undermining Maximum Distance (cm) 0.3 cm 12/23/21 1502   Wound Assessment Devitalized tissue;Dusky;Fibrin;Slough 12/23/21 1502   Drainage Amount Moderate 12/23/21 1502   Drainage Description Serosanguinous 12/23/21 1502   Wound Odor None 12/23/21 1502   Ling-Wound/Incision Assessment Blanchable erythema; Intact 12/23/21 1502   Edges Epibole (rolled edges) 12/23/21 1502   Wound Thickness Description Full thickness 12/23/21 1502   Number of days: 6        Percent of Wound(s)/Ulcer(s) Debrided: 100%    Total Surface Area Debrided: 1.56 sq cm     Diabetic/Pressure/Non Pressure Ulcers only:  Ulcer:     Estimated Blood Loss:  Minimal     Hemostasis Achieved: Pressure    Procedural Pain: 1 / 10     Post Procedural Pain: 2 / 10     Response to treatment: Well tolerated by patient

## 2021-12-30 ENCOUNTER — HOSPITAL ENCOUNTER (OUTPATIENT)
Dept: WOUND CARE | Age: 81
Discharge: HOME | End: 2021-12-30
Attending: PODIATRIST
Payer: MEDICARE

## 2021-12-30 VITALS — OXYGEN SATURATION: 95 % | TEMPERATURE: 98.8 F | HEART RATE: 86 BPM | RESPIRATION RATE: 16 BRPM

## 2021-12-30 PROCEDURE — 97605 NEG PRS WND THER DME<=50SQCM: CPT

## 2021-12-30 NOTE — DISCHARGE INSTRUCTIONS
In wound care clinic today:12/30/2021  Cleanse wound with NS or soap and water or commercial wound cleanser  Apply the following to wound bed:collagen ag, mepitel one,   Apply the following to alysha-wound:skin prep  Apply the following dressings:Vac via  125 mm/hg, secure with 6\" Ace wrap. For Home Care/Self Care  Frequency:  Leave dressings in place until next visit    Patient to return for wound care in:7  Days    PLEASE CONTACT OFFICE AS SOON AS POSSIBLE IF UNABLE TO MAKE THIS APPOINTMENT. Inspect your wounds, looking for signs of infection which may include the following:  Increase in redness  Red \"streaks\" from wound  Increase in swelling  Fever  Unusual odor  Change in the amount of wound drainage. Should you experience any significant changes in your wound(s) or have any questions regarding your home care instructions please contact the wound center or your home health company. If after regular business hours, please call your family doctor or local emergency room. Edema Control: Elevate legs as much as possible. Avoid standing in one position for more than 10 minutes. Avoid setting with legs down. Do not cross legs while sitting. Off-Loading:Frequent position changes. Do not cross legs while sitting. Shift weight every 20 minutes or more when sitting for prolonged periods of time.

## 2021-12-30 NOTE — WOUND CARE
12/30/21 1149   Wound Knee Left surgical 12/23/21   Date First Assessed/Time First Assessed: 12/23/21 1100   Present on Hospital Admission: Yes  Wound Approximate Age at First Assessment (Weeks): 4 weeks  Primary Wound Type: Open incision/surgical site  Location: Knee  Wound Location Orientation: Left . .. Wound Image    Wound Etiology Non-Healing Surgical   Dressing Status Intact; New drainage noted   Cleansed Wound cleanser   Dressing/Treatment Ace wrap;Barrier film;Collagen with Ag;Gauze dressing/dressing sponge;Non-adherent;Negative Pressure Wound Therapy   Offloading for Diabetic Foot Ulcers Other (comment)   Dressing Change Due 01/07/22   Wound Length (cm) 1.2 cm   Wound Width (cm) 1.5 cm   Wound Depth (cm) 1.3 cm   Wound Surface Area (cm^2) 1.8 cm^2   Change in Wound Size % -15.38   Wound Volume (cm^3) 2.34 cm^3   Wound Healing % -114   Undermining Starts ___ O'Clock 12 o'clock   Undermining Ends ___ O'Clock 12 o'clock   Undermining Maximum Distance (cm) 0.2 cm   Wound Assessment Devitalized tissue   Drainage Amount Moderate   Drainage Description Serosanguinous   Wound Odor None   Ling-Wound/Incision Assessment Blanchable erythema   Edges Epibole (rolled edges)   Wound Thickness Description Full thickness

## 2022-01-03 ENCOUNTER — APPOINTMENT (OUTPATIENT)
Dept: WOUND CARE | Age: 82
End: 2022-01-03
Attending: PODIATRIST

## 2022-01-07 ENCOUNTER — HOSPITAL ENCOUNTER (OUTPATIENT)
Dept: WOUND CARE | Age: 82
Discharge: HOME | End: 2022-01-07
Attending: PODIATRIST
Payer: MEDICARE

## 2022-01-07 VITALS
SYSTOLIC BLOOD PRESSURE: 119 MMHG | OXYGEN SATURATION: 98 % | DIASTOLIC BLOOD PRESSURE: 79 MMHG | RESPIRATION RATE: 16 BRPM | TEMPERATURE: 98.4 F | HEART RATE: 84 BPM

## 2022-01-07 PROCEDURE — 97605 NEG PRS WND THER DME<=50SQCM: CPT

## 2022-01-07 NOTE — DISCHARGE INSTRUCTIONS
Discharge Instructions for  [address]  [address] [place], [place], [facility]0 [facility]  [phone] Fax [phone]    NAME:  Tika Drake OF BIRTH:  1940  MEDICAL RECORD NUMBER:  [id number]  DATE:  1/7/2022    Wound Cleansing:   Do not scrub or use excessive force. Cleanse wound prior to applying a clean dressing with:  [x] Normal Saline [] Keep Wound Dry in Shower    [x] Wound Cleanser   [] Cleanse wound with Mild Soap & Water  [] May Shower at Discharge   [] Other:      Topical Treatments:  Do not apply lotions, creams, or ointments to wound bed unless directed. [] Apply moisturizing lotion to skin surrounding the wound prior to dressing change.  [] Apply antifungal ointment to skin surrounding the wound prior to dressing change. [x] Apply thin film of moisture barrier ointment to skin immediately around wound. [] Other:       Dressings:           Wound Location ***   [x] Apply Primary Dressing:       [] MediHoney Gel [] Alginate with Silver [] Alginate   [x] Collagen [] Collagen with Silver   [] Santyl with Moisten saline gauze     [] Hydrocolloid   [] MediHoney Alginate [] Foam with Silver   [x] Foam   [] Hydrofera Blue    [] Mepilex Border    [] Moisten with Saline [] Hydrogel [x] Mepitel     [] Bactroban/Mupirocin [] Polysporin  [] Other:    [] Pack wound loosely with  [] Iodoform   [] Plain Packing  [] Other   [x] Cover and Secure with:     [] Gauze [] Connie [] Kerlix   [x] Ace Wrap [] Cover Roll Tape [] ABD     [] Other:    Avoid contact of tape with skin.   [x] Change dressing: [] Daily    [] Every Other Day [] Three times per week   [x] Once a week [] Do Not Change Dressing   [] Other:     Negative Pressure:           Wound Location:   [x] Pressure@     125      mm/Hg  [x]Continuous []Intermittent   [x] Black  [] White Foam [] Other:   [x]Change dressing: []Three times per week    [x]Other: 1 time weekly  Pressure Relief:  [x] When sitting, shift position or do seat lifts every 15 minutes. [x] Wheelchair cushion [] Specialty Bed/Mattress  [] Turn every 2 hours when in bed. Avoid position directing pressure on wound site. Limit side lying to 30 degree tilt. Limit HOB elevation to 30 degrees. Edema Control:  Apply: [] Compression Stocking []Right Leg []Left Leg   [] Tubigrip []Right Leg Double Layer []Left Leg Double Layer       []Right Leg Single Layer []Left Leg Single Layer   [] SpandaGrip []Right Leg  []Left Leg      []Low compression 5-10 mm/Hg      []Medium compression 10-20 mm/Hg     []High compression  20-30 mm/Hg   every morning immediately when getting up should be applied to affected leg(s) from mid foot to knee making sure to cover the heel. Remove every night before going to bed. [x] Elevate leg(s) above the level of the heart when sitting. [x] Avoid prolonged standing in one place. [] Elevate arm/hand above the level of the heart []RightArm []LeftArm     Compression:  Apply: [] Multilayer Compression Wrap Applied in Clinic []RightLeg []Left Leg   [] Multi-layer compression. Do not get leg(s) with wrap wet. If wraps become too tight call the center or completely remove the wrap. [x] Elevate leg(s) above the level of the heart when sitting. [] Avoid prolonged standing in one place. Off-Loading:   [] Off-loading when [] walking  [] in bed [] sitting  [] Total non-weight bearing  [] Right Leg  [] Left Leg   [x] Assistive Device [] Walker [] Cane  [x] Wheelchair  [] Crutches   [] Surgical shoe    [] Podus Boot(s)   [] Foam Boot(s)  [] Roll About    [] Cast Boot [] CROW Boot  [] Other:    Contact Cast:  Apply: [] Total Contact Cast Applied in Clinic []RightLeg []Left Leg   [] Do not get cast wet. Contact center or go to emergency room if there is a foul odor or becomes uncomfortable due to feeling tight or swelling. Do not use objects inside of cast to scratch.       Dietary:  [x] Diet as tolerated: [] Calorie Diabetic Diet: [] No Added Salt:  [] Increase Protein: [] Other:   Activity:  [x] Activity as tolerated:  [x] Patient has no activity restrictions     [] Strict Bedrest: [] Remain off Work:     [] May return to full duty work:                                   [] Return to work with restrictions:             Return Appointment:  [x] Wound and dressing supply provider:   [] ECF or Home Healthcare:  [x] Wound Assessment: [x] Physician or NP scheduled for Wound Assessment:   [x] Return Appointment: 1 Week(s)  [] Ordered tests:      Electronically signed Agustin Berry LPN on 5/9/3574 at 7:42 PM     [address] 281: Should you experience any significant changes in your wound(s) or have questions about your wound care, please contact the [facility] Main at [address] [place] [address] 8:00 am - 4:30. If you need help with your wound outside these hours and cannot wait until we are again available, contact your PCP or go to the hospital emergency room. PLEASE NOTE: IF YOU ARE UNABLE TO OBTAIN WOUND SUPPLIES, CONTINUE TO USE THE SUPPLIES YOU HAVE AVAILABLE UNTIL YOU ARE ABLE TO REACH US. IT IS MOST IMPORTANT TO KEEP THE WOUND COVERED AT ALL TIMES.      Physician Signature:_______________________    Date: ___________ Time:  ____________

## 2022-01-07 NOTE — WOUND CARE
01/07/22 1036   Wound Knee Left surgical 12/23/21   Date First Assessed/Time First Assessed: 12/23/21 1100   Present on Hospital Admission: Yes  Wound Approximate Age at First Assessment (Weeks): 4 weeks  Primary Wound Type: Open incision/surgical site  Location: Knee  Wound Location Orientation: Left . .. Wound Image    Wound Etiology Non-Healing Surgical   Dressing Status Intact; Old drainage noted   Cleansed Wound cleanser   Dressing/Treatment Ace wrap;Negative Pressure Wound Therapy   Offloading for Diabetic Foot Ulcers No offloading required; Other (comment)   Dressing Change Due 01/14/22   Wound Length (cm) 1 cm   Wound Width (cm) 1.3 cm   Wound Depth (cm) 0.5 cm   Wound Surface Area (cm^2) 1.3 cm^2   Change in Wound Size % 16.67   Wound Volume (cm^3) 0.65 cm^3   Wound Healing % 40   Undermining Starts ___ O'Clock 2 o'clock   Undermining Ends ___ O'Clock 9 o'clock   Undermining Maximum Distance (cm) 0.5 cm   Wound Assessment Devitalized tissue   Drainage Amount Moderate   Drainage Description Serosanguinous   Wound Odor None   Ling-Wound/Incision Assessment Blanchable erythema   Edges Epibole (rolled edges)   Wound Thickness Description Full thickness

## 2022-01-14 ENCOUNTER — HOSPITAL ENCOUNTER (OUTPATIENT)
Dept: WOUND CARE | Age: 82
Discharge: HOME | End: 2022-01-14
Attending: PODIATRIST
Payer: MEDICARE

## 2022-01-14 VITALS
HEART RATE: 109 BPM | OXYGEN SATURATION: 97 % | DIASTOLIC BLOOD PRESSURE: 74 MMHG | TEMPERATURE: 98 F | RESPIRATION RATE: 16 BRPM | SYSTOLIC BLOOD PRESSURE: 149 MMHG

## 2022-01-14 PROCEDURE — 11042 DBRDMT SUBQ TIS 1ST 20SQCM/<: CPT

## 2022-01-14 NOTE — WOUND CARE
01/14/22 1032   Wound Knee Left surgical 12/23/21   Date First Assessed/Time First Assessed: 12/23/21 1100   Present on Hospital Admission: Yes  Wound Approximate Age at First Assessment (Weeks): 4 weeks  Primary Wound Type: Open incision/surgical site  Location: Knee  Wound Location Orientation: Left . ..    Wound Length (cm) 1 cm   Wound Width (cm) 1.4 cm   Wound Depth (cm) 0.5 cm   Wound Surface Area (cm^2) 1.4 cm^2   Change in Wound Size % 10.26   Wound Volume (cm^3) 0.7 cm^3   Wound Healing % 36   Post-Procedure Length (cm) 1 cm   Post-Procedure Width (cm) 1.4 cm   Post-Procedure Depth (cm) 0.6 cm   Post-Procedure Surface Area (cm^2) 1.4 cm^2   Post-Procedure Volume (cm^3) 0.84 cm^3   Undermining Starts ___ O'Clock 7 o'clock   Undermining Ends ___ O'Clock 9 o'clock   Undermining Maximum Distance (cm) 0.2 cm   Wound Assessment Devitalized tissue   Drainage Amount Moderate   Drainage Description Serosanguinous   Wound Odor None   Ling-Wound/Incision Assessment Blanchable erythema   Edges Epibole (rolled edges)   Wound Thickness Description Full thickness

## 2022-01-14 NOTE — DISCHARGE INSTRUCTIONS
Discharge Instructions for  [address]  [address] [place], [place], [facility]0 [facility]  [phone] Fax [phone]    NAME:  Hilary Hoskins OF BIRTH:  1940  MEDICAL RECORD NUMBER:  [id number]  DATE:  1/14/2022    Wound Cleansing:   Do not scrub or use excessive force. Cleanse wound prior to applying a clean dressing with:  [x] Normal Saline [] Keep Wound Dry in Shower    [x] Wound Cleanser   [] Cleanse wound with Mild Soap & Water  [] May Shower at Discharge   [] Other:      Topical Treatments:  Do not apply lotions, creams, or ointments to wound bed unless directed. [x] Apply moisturizing lotion to skin surrounding the wound prior to dressing change.  [] Apply antifungal ointment to skin surrounding the wound prior to dressing change. [x] Apply thin film of moisture barrier ointment to skin immediately around wound. [] Other:       Dressings:           Wound Location ***   [x] Apply Primary Dressing:       [] MediHoney Gel [] Alginate with Silver [] Alginate   [] Collagen [x] Collagen with Silver   [] Santyl with Moisten saline gauze     [] Hydrocolloid   [] MediHoney Alginate [] Foam with Silver   [] Foam   [] Hydrofera Blue    [] Mepilex Border    [] Moisten with Saline [] Hydrogel [x] Mepitel one, black sponge, drape     [] Bactroban/Mupirocin [] Polysporin  [] Other:    [] Pack wound loosely with  [] Iodoform   [] Plain Packing  [] Other   [x] Cover and Secure with:     [x] Gauze [x] Connie [] Kerlix   [] Ace Wrap [] Cover Roll Tape [] ABD     [] Other:    Avoid contact of tape with skin.   [x] Change dressing: [] Daily    [] Every Other Day [] Three times per week   [x] Once a week [] Do Not Change Dressing   [] Other:     Negative Pressure:           Wound Location:   [x] Pressure@           mm/Hg  []Continuous []Intermittent   [x] Black  [] White Foam [] Other:   [x]Change dressing: []Three times per week    [x]Other: 1 time weekly    Pressure Relief:  [x] When sitting, shift position or do seat lifts every 15 minutes. [x] Wheelchair cushion [x] Specialty Bed/Mattress  [x] Turn every 2 hours when in bed. Avoid position directing pressure on wound site. Limit side lying to 30 degree tilt. Limit HOB elevation to 30 degrees. Edema Control:  Apply: [] Compression Stocking []Right Leg []Left Leg   [] Tubigrip []Right Leg Double Layer []Left Leg Double Layer       []Right Leg Single Layer []Left Leg Single Layer   [] SpandaGrip []Right Leg  []Left Leg      []Low compression 5-10 mm/Hg      []Medium compression 10-20 mm/Hg     []High compression  20-30 mm/Hg   every morning immediately when getting up should be applied to affected leg(s) from mid foot to knee making sure to cover the heel. Remove every night before going to bed. [x] Elevate leg(s) above the level of the heart when sitting. [x] Avoid prolonged standing in one place. [] Elevate arm/hand above the level of the heart []RightArm []LeftArm     Compression:  Apply: [] Multilayer Compression Wrap Applied in Clinic []RightLeg []Left Leg   [] Multi-layer compression. Do not get leg(s) with wrap wet. If wraps become too tight call the center or completely remove the wrap. [] Elevate leg(s) above the level of the heart when sitting. [] Avoid prolonged standing in one place. Off-Loading:   [x] Off-loading when [x] walking  [x] in bed [x] sitting  [] Total non-weight bearing  [] Right Leg  [] Left Leg   [x] Assistive Device [x] Walker [] Cane  [x] Wheelchair  [] Crutches   [] Surgical shoe    [] Podus Boot(s)   [] Foam Boot(s)  [] Roll About    [] Cast Boot [] CROW Boot  [] Other:    Contact Cast:  Apply: [] Total Contact Cast Applied in Clinic []RightLeg []Left Leg   [] Do not get cast wet. Contact center or go to emergency room if there is a foul odor or becomes uncomfortable due to feeling tight or swelling. Do not use objects inside of cast to scratch.       Dietary:  [x] Diet as tolerated: [] Calorie Diabetic Diet: [] No Added Salt:  [] Increase Protein: [] Other:   Activity:  [x] Activity as tolerated:  [x] Patient has no activity restrictions     [] Strict Bedrest: [] Remain off Work:     [] May return to full duty work:                                   [] Return to work with restrictions:             Return Appointment:  [x] Wound and dressing supply provider:   [] ECF or Home Healthcare:  [x] Wound Assessment: [x] Physician or NP scheduled for Wound Assessment:   [x] Return Appointment:Jerry Queen'PATRICIO'' )  [] Ordered tests:      Electronically signed Michael Calderón LPN on 1/15/2575 at 5:54 PM     [address] Brady 281: Should you experience any significant changes in your wound(s) or have questions about your wound care, please contact the [facility] Main at [address] [place] [address] 8:00 am - 4:30. If you need help with your wound outside these hours and cannot wait until we are again available, contact your PCP or go to the hospital emergency room. PLEASE NOTE: IF YOU ARE UNABLE TO OBTAIN WOUND SUPPLIES, CONTINUE TO USE THE SUPPLIES YOU HAVE AVAILABLE UNTIL YOU ARE ABLE TO REACH US. IT IS MOST IMPORTANT TO KEEP THE WOUND COVERED AT ALL TIMES.      Physician Signature:_______________________    Date: ___________ Time:  ____________

## 2022-01-18 NOTE — WOUND CARE
Debridement Wound Care        Problem List Items Addressed This Visit     None          Procedure Note  Indications:  Based on my examination of this patient's wound(s)/ulcer(s) today, debridement is required to promote healing and evaluate the wound base. Patient is following up at the [address] [place] [address] for her left knee surgery wound from a past infection from Dr. Ashlee Ordonez, surgery. The ulcer measuring (1cmx 1.4cm x 0.5cm) has a tan fibrotic base, deep to the subcutaneous level, moderate serosanguineous drainage, blanchable erythema, negative signs of infection. Sharp debridement required on the Left Knee Ulcer, applied Endo form, Wound VAC. Performed by: Laura Larsen DPM    Consent obtained: Yes    Time out taken: Yes    Debridement: Excisional    Using curette the wound(s)/ulcer(s) was/were sharply debrided down through and including the removal of    subcutaneous tissue    Devitalized Tissue Debrided: slough    Pre Debridement Measurements:  Are located in the [place]  Documentation Flow Sheet    Wound/Ulcer #: 1    Post Debridement Measurements:  Wound/Ulcer Descriptions are Pre Debridement except measurements:Non-Pressure ulcer, fat layer exposed    Wound Chin Left wet 08/12/21 (Active)   Wound Image   08/12/21 1618   Wound Etiology Other (Comment) 08/12/21 1618   Dressing Status Intact; New dressing applied; Old drainage noted 08/12/21 1618   Cleansed Wound cleanser 08/12/21 1618   Dressing/Treatment Alginate with Ag; Other (Comment) 08/12/21 1618   Dressing Change Due 08/19/21 08/12/21 1618   Wound Length (cm) 3 cm 08/12/21 1618   Wound Width (cm) 2.5 cm 08/12/21 1618   Wound Depth (cm) 0.1 cm 08/12/21 1618   Wound Surface Area (cm^2) 7.5 cm^2 08/12/21 1618   Wound Volume (cm^3) 0.75 cm^3 08/12/21 1618   Post-Procedure Length (cm) 3.1 cm 08/12/21 1618   Post-Procedure Width (cm) 2.6 cm 08/12/21 1618   Post-Procedure Depth (cm) 0.1 cm 08/12/21 1618   Post-Procedure Surface Area (cm^2) 8.06 cm^2 08/12/21 1618   Post-Procedure Volume (cm^3) 0.806 cm^3 08/12/21 1618   Wound Assessment Pink/red 08/12/21 1618   Drainage Amount Small 08/12/21 1618   Drainage Description Thin;Yellow 08/12/21 1618   Wound Odor None 08/12/21 1618   Number of days: 159       Wound Chin Left; Inferior 08/12/21 (Active)   Wound Etiology Other (Comment) 08/12/21 1618   Dressing Status Intact 08/12/21 1618   Cleansed Wound cleanser 08/12/21 1618   Dressing/Treatment Alginate with Ag; Other (Comment) 08/12/21 1618   Dressing Change Due 08/19/21 08/12/21 1618   Wound Length (cm) 1.5 cm 08/12/21 1618   Wound Width (cm) 1 cm 08/12/21 1618   Wound Depth (cm) 0.1 cm 08/12/21 1618   Wound Surface Area (cm^2) 1.5 cm^2 08/12/21 1618   Wound Volume (cm^3) 0.15 cm^3 08/12/21 1618   Post-Procedure Length (cm) 1.6 cm 08/12/21 1618   Post-Procedure Width (cm) 1 cm 08/12/21 1618   Post-Procedure Depth (cm) 0.1 cm 08/12/21 1618   Post-Procedure Surface Area (cm^2) 1.6 cm^2 08/12/21 1618   Post-Procedure Volume (cm^3) 0.16 cm^3 08/12/21 1618   Wound Assessment Pink/red 08/12/21 1618   Drainage Amount Small 08/12/21 1618   Drainage Description Thin;Yellow 08/12/21 1618   Wound Odor None 08/12/21 1618   Ling-Wound/Incision Assessment Blanchable erythema 08/12/21 1618   Edges Attached edges 08/12/21 1618   Wound Thickness Description Full thickness 08/12/21 1618   Number of days: 159       Wound Pretibial Left Bullous type lesion 06/01/21 (Active)   Wound Image   08/27/21 1334   Wound Etiology Other (Comment) 08/27/21 1334   Dressing Status Breakthrough drainage noted 08/27/21 1334   Cleansed Cleansed with saline 08/27/21 1334   Dressing/Treatment Alginate with Ag 08/27/21 1334   Dressing Change Due 08/28/21 08/27/21 1334   Wound Length (cm) 5 cm 08/27/21 1334   Wound Width (cm) 3.5 cm 08/27/21 1334   Wound Depth (cm) 0.1 cm 08/27/21 1334   Wound Surface Area (cm^2) 17.5 cm^2 08/27/21 1334   Wound Volume (cm^3) 1.75 cm^3 08/27/21 1334   Wound Assessment Other (Comment) 08/27/21 1334   Drainage Amount Large 08/27/21 1334   Drainage Description Serous 08/27/21 1334   Wound Odor None 08/27/21 1334   Ling-Wound/Incision Assessment Blanchable erythema 08/27/21 1334   Edges Other (Comment) 08/27/21 1334   Wound Thickness Description Partial thickness 08/27/21 1334   Number of days: 159       Wound Knee Left surgical 12/23/21 (Active)   Wound Image   01/14/22 1032   Wound Etiology Non-Healing Surgical 01/14/22 1032   Dressing Status Intact; Old drainage noted 01/14/22 1032   Cleansed Wound cleanser 01/14/22 1032   Dressing/Treatment Ace wrap;Collagen with Ag;Gauze dressing/dressing sponge;Roll gauze;Negative Pressure Wound Therapy 01/14/22 1032   Offloading for Diabetic Foot Ulcers No offloading required 01/14/22 1032   Dressing Change Due 01/28/22 01/14/22 1032   Wound Length (cm) 1 cm 01/14/22 1032   Wound Width (cm) 1.4 cm 01/14/22 1032   Wound Depth (cm) 0.5 cm 01/14/22 1032   Wound Surface Area (cm^2) 1.4 cm^2 01/14/22 1032   Change in Wound Size % 10.26 01/14/22 1032   Wound Volume (cm^3) 0.7 cm^3 01/14/22 1032   Wound Healing % 36 01/14/22 1032   Post-Procedure Length (cm) 1 cm 01/14/22 1032   Post-Procedure Width (cm) 1.4 cm 01/14/22 1032   Post-Procedure Depth (cm) 0.6 cm 01/14/22 1032   Post-Procedure Surface Area (cm^2) 1.4 cm^2 01/14/22 1032   Post-Procedure Volume (cm^3) 0.84 cm^3 01/14/22 1032   Undermining Starts ___ O'Clock 7 o'clock 01/14/22 1032   Undermining Ends ___ O'Clock 9 o'clock 01/14/22 1032   Undermining Maximum Distance (cm) 0.2 cm 01/14/22 1032   Wound Assessment Devitalized tissue 01/14/22 1032   Drainage Amount Moderate 01/14/22 1032   Drainage Description Serosanguinous 01/14/22 1032   Wound Odor None 01/14/22 1032   Ling-Wound/Incision Assessment Blanchable erythema 01/14/22 1032   Edges Epibole (rolled edges) 01/14/22 1032   Wound Thickness Description Full thickness 01/14/22 1032   Number of days: 26        Percent of Wound(s)/Ulcer(s) Debrided: 100%    Total Surface Area Debrided:  1.4 sq cm     Diabetic/Pressure/Non Pressure Ulcers only:  Ulcer:     Estimated Blood Loss:  Minimal     Hemostasis Achieved: Pressure    Procedural Pain: 2 / 10     Post Procedural Pain: 3 / 10     Response to treatment: Well tolerated by patient

## 2022-01-20 ENCOUNTER — HOSPITAL ENCOUNTER (OUTPATIENT)
Dept: WOUND CARE | Age: 82
Discharge: HOME | End: 2022-01-20
Attending: PODIATRIST
Payer: MEDICARE

## 2022-01-20 VITALS
OXYGEN SATURATION: 96 % | HEART RATE: 110 BPM | RESPIRATION RATE: 16 BRPM | SYSTOLIC BLOOD PRESSURE: 103 MMHG | TEMPERATURE: 98 F | DIASTOLIC BLOOD PRESSURE: 55 MMHG

## 2022-01-20 PROCEDURE — 97607 NEG PRS WND THR NDME<=50SQCM: CPT

## 2022-01-20 NOTE — WOUND CARE
01/20/22 0948   Wound Knee Left surgical 12/23/21   Date First Assessed/Time First Assessed: 12/23/21 1100   Present on Hospital Admission: Yes  Wound Approximate Age at First Assessment (Weeks): 4 weeks  Primary Wound Type: Open incision/surgical site  Location: Knee  Wound Location Orientation: Left . .. Wound Image    Wound Etiology Non-Healing Surgical   Dressing Status Reinforced dressing   Cleansed Wound cleanser   Dressing/Treatment Negative Pressure Wound Therapy; Collagen with Ag   Dressing Change Due 01/28/22   Wound Length (cm) 0.7 cm   Wound Width (cm) 1.2 cm   Wound Depth (cm) 0.4 cm   Wound Surface Area (cm^2) 0.84 cm^2   Change in Wound Size % 46.15   Wound Volume (cm^3) 0.336 cm^3   Wound Healing % 69   Undermining Starts ___ O'Clock 8 o'clock   Undermining Ends ___ O'Clock 11 o'clock   Undermining Maximum Distance (cm) 0.2 cm   Wound Assessment Pale granulation tissue   Drainage Amount Small   Drainage Description Serosanguinous   Wound Odor None   Alysha-Wound/Incision Assessment Fragile   Edges Epibole (rolled edges)   Wound Thickness Description Full thickness     Negative Pressure    NAME:  Kathy Ulrich  YOB: 1940  MEDICAL RECORD NUMBER:  [id number]  DATE:  1/20/2022     Applied Negative Pressure to KNEE wound(s)/ulcer(s).  [x] Applied skin barrier prep to alysha-wound.  [x] Cut strips of plastic drape to picture frame wound so that alysha-wound is     covered with the drape.  [] If bridging dressing to less prominent site, cover any intact skin that will come in contact with the Negative Pressure Therapy sponge, gauze or channel drain with plastic drape. The sponge should never touch intact skin.  [x] Cut sponge, gauze or channel drain to size which will fit into the wound/ulcer bed without being forced.  [x] Be sure the sponge is large enough to hold the entire round plastic flange which is attached to the tubing.   Never allow flange to be larger than the sponge or it will produce suction damaging intact skin.  Total number of individual pieces of foam used within the wound bed: 1   [] If bridging the dressing away from the primary site, be sure the bridge leads to a piece of sponge large enough to hold the entire flange without allowing any of the flange to overlap onto intact skin.  [x] Covered sponge, gauze or channel drain with plastic drape.  [x] Cut a hole in this plastic drape directly over the sponge the same size as the plastic drain tubing.  [x] Removed plastic liner from flange and apply it directly over the hole you cut.  [x] Removed the plastic cover from the flange.  [x] Attached the tubing to the wound/ulcer Negative Pressure Therapy and turn it on to be sure a vacuum is created and that there are no leaks.  [x] If air leaks occur, use plastic drape to patch them.  [x] Secured Negative Pressure Therapy dressing with ace wrap loosely if located on an extremity. Maintain tubing outside of ace wrap. Tubing must not exert pressure on intact skin.     Response to treatment: Well tolerated by patient      Applied per  Guidelines      Electronically signed by Yissel cMdowell RN on 1/20/2022 at 2:42 PM

## 2022-01-21 ENCOUNTER — APPOINTMENT (OUTPATIENT)
Dept: WOUND CARE | Age: 82
End: 2022-01-21
Attending: PODIATRIST
Payer: MEDICARE

## 2022-01-28 ENCOUNTER — APPOINTMENT (OUTPATIENT)
Dept: WOUND CARE | Age: 82
End: 2022-01-28
Attending: PODIATRIST